# Patient Record
(demographics unavailable — no encounter records)

---

## 2024-10-29 NOTE — HISTORY OF PRESENT ILLNESS
[70: Cares for self; unalbe to carry on normal activity or do active work.] : 70: Cares for self; unable to carry on normal activity or do active work. 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 Patient began to make urine during her hospital stay, was discharged on 9/10/2019 and then required urgent HD on 9/12/2019 for fluid overload. Patient discharged again on 9/16/2019\par  \par  Pt had 2 recent admissions to [facility].  First with dyspnea and cardiac arrest - may have been related to high potassium.  Second admission for GI discomfort and had an endoscopy and colonoscopy - had a polyp removed.  Renal function improved eventually to a creatinine of 1.4.  \par  \par  Had PPM placed March 2020.\par  \par  PT seen at [facility] for concern over COVID but was negative in April 2020.  Then admitted in May, again negative for COVID but had respiratory failure requiring intubation.\par  \par  Pt was admitted in February with Covid19 2022.  Treated with remdesivir and decadron, discharged on oxygen but now off.  \par  \par   [de-identified] : Pt travelled to [facility] in august, developed right arm swelling, while off of Eliquis.  Came back, saw Dr. Urbina and started Eliquis, right arm swelling improved.  She was also recently told that her calcium was high by her PMD.   Pt has had bone density but was normal by patient report.

## 2024-10-29 NOTE — PLAN
[FreeTextEntry1] : 1. 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 Creatinine alfredito 1.3 but had DELFIN several times. Last creatinine 1.8 on diuretics. Had cfDNA several times which have been normal. 2. Immunosuppressed - target tacro 5-7. She is on tacro 2/2 and pred 5. Does not tolerate full dose MMF. Currently on MMF 250mgs BID. 3. HTN - BP controlled.  Ok to cont hydralazine 100mgs BID.  4. DM - stable. A1c elevated. Now following with her PMD. 5. RUE DVT - fistula arm. Has partially occlusive DVT, Eliquis was changed to 2.5mgs BID in hospital which she is still on. Right arm still slightly swollen likely due to central stenosis. Pt at risk for ROSALEE if she had fistula ballooned and may need fistula ligated. For now will try conservative treatment including arm band/ compression.  Edema worsened when off of Eliquis so now back on it.   7. Heart block - Had PPM placed in hospital in March 2020. Follows with Dr. Urbina. 8. Respiratory failure - multiple episodes since transplantation but no recent episodes. Unclear if related to cardiac disease or pulmonary. Has seen cardiology and had pacemaker placed for bradycardia. Renal duplex on 2/29 was not suggestive of TRAS. No recent episodes. Saw Dr. Urbina - repeat echo with LVH and some mitral disease.  Continue lasix.   9. Hyperparathyroidism - mild hypercalcemia.  She has tried sensipar in the past but she thought it caused edema.  will resume 30mgs daily.     f/u in 3 months. Pts PMD is Douglas Junior ([id number])

## 2024-10-29 NOTE — HISTORY OF PRESENT ILLNESS
[70: Cares for self; unalbe to carry on normal activity or do active work.] : 70: Cares for self; unable to carry on normal activity or do active work. 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 Patient began to make urine during her hospital stay, was discharged on 9/10/2019 and then required urgent HD on 9/12/2019 for fluid overload. Patient discharged again on 9/16/2019\par  \par  Pt had 2 recent admissions to [facility].  First with dyspnea and cardiac arrest - may have been related to high potassium.  Second admission for GI discomfort and had an endoscopy and colonoscopy - had a polyp removed.  Renal function improved eventually to a creatinine of 1.4.  \par  \par  Had PPM placed March 2020.\par  \par  PT seen at [facility] for concern over COVID but was negative in April 2020.  Then admitted in May, again negative for COVID but had respiratory failure requiring intubation.\par  \par  Pt was admitted in February with Covid19 2022.  Treated with remdesivir and decadron, discharged on oxygen but now off.  \par  \par   [de-identified] : Pt travelled to [place] in august, developed right arm swelling, while off of Eliquis.  Came back, saw Dr. Urbina and started Eliquis, right arm swelling improved.  She was also recently told that her calcium was high by her PMD.   Pt has had bone density but was normal by patient report.

## 2024-10-29 NOTE — REVIEW OF SYSTEMS
[As Noted in HPI] : as noted in HPI [Negative] : Heme/Lymph [Feeling Poorly] : not feeling poorly [Feeling Tired] : not feeling tired [Discharge From Eyes] : no purulent discharge from the eyes [Eyes Itch] : no itching of the eyes [Chest Pain] : no chest pain [Palpitations] : no palpitations [Wheezing] : no wheezing [SOB on Exertion] : no shortness of breath during exertion [Vomiting] : no vomiting [Diarrhea] : no diarrhea [Joint Swelling] : no joint swelling [Joint Stiffness] : no joint stiffness

## 2024-10-29 NOTE — BEGINNING OF VISIT
Problem: Musculor/Skeletal Functional Status  Intervention: PT Evaluation/treatment  Note: Increase safety and independence with functional mobility. [Patient] : patient

## 2024-10-29 NOTE — PLAN
[FreeTextEntry1] : 1. 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 Creatinine alfredito 1.3 but had DELFIN several times. Last creatinine 1.8 on diuretics. Had cfDNA several times which have been normal. 2. Immunosuppressed - target tacro 5-7. She is on tacro 2/2 and pred 5. Does not tolerate full dose MMF. Currently on MMF 250mgs BID. 3. HTN - BP controlled.  Ok to cont hydralazine 100mgs BID.  4. DM - stable. A1c elevated. Now following with her PMD. 5. RUE DVT - fistula arm. Has partially occlusive DVT, Eliquis was changed to 2.5mgs BID in hospital which she is still on. Right arm still slightly swollen likely due to central stenosis. Pt at risk for ROSALEE if she had fistula ballooned and may need fistula ligated. For now will try conservative treatment including arm band/ compression.  Edema worsened when off of Eliquis so now back on it.   7. Heart block - Had PPM placed in hospital in March 2020. Follows with Dr. Urbina. 8. Respiratory failure - multiple episodes since transplantation but no recent episodes. Unclear if related to cardiac disease or pulmonary. Has seen cardiology and had pacemaker placed for bradycardia. Renal duplex on 2/29 was not suggestive of TRAS. No recent episodes. Saw Dr. Urbina - repeat echo with LVH and some mitral disease.  Continue lasix.   9. Hyperparathyroidism - mild hypercalcemia.  She has tried sensipar in the past but she thought it caused edema.  will resume 30mgs daily.     f/u in 3 months. Pts PMD is Douglas Junior ([id number])

## 2024-10-29 NOTE — PHYSICAL EXAM
15 [General Appearance - Alert] : alert [General Appearance - In No Acute Distress] : in no acute distress [Outer Ear] : the ears and nose were normal in appearance [Neck Appearance] : the appearance of the neck was normal [Jugular Venous Distention Increased] : there was no jugular-venous distention [Respiration, Rhythm And Depth] : normal respiratory rhythm and effort [Exaggerated Use Of Accessory Muscles For Inspiration] : no accessory muscle use [Auscultation Breath Sounds / Voice Sounds] : lungs were clear to auscultation bilaterally [Heart Sounds Gallop] : no gallops [Heart Sounds Pericardial Friction Rub] : no pericardial rub [Abdomen Soft] : soft [Abdomen Tenderness] : non-tender [Cervical Lymph Nodes Enlarged Posterior Bilaterally] : posterior cervical [Cervical Lymph Nodes Enlarged Anterior Bilaterally] : anterior cervical [Involuntary Movements] : no involuntary movements were seen [___ (cm) Fistula] : [unfilled] (cm) fistula [Dialysis Catheter] : dialysis catheter [Skin Color & Pigmentation] : normal skin color and pigmentation [] : no rash [Cranial Nerves] : cranial nerves 2-12 were intact [No Focal Deficits] : no focal deficits [Oriented To Time, Place, And Person] : oriented to person, place, and time [Impaired Insight] : insight and judgment were intact [FreeTextEntry1] : mild RUE edema.  AVF present.

## 2024-10-29 NOTE — PHYSICAL EXAM
[General Appearance - Alert] : alert [General Appearance - In No Acute Distress] : in no acute distress [Outer Ear] : the ears and nose were normal in appearance [Neck Appearance] : the appearance of the neck was normal [Jugular Venous Distention Increased] : there was no jugular-venous distention [Respiration, Rhythm And Depth] : normal respiratory rhythm and effort [Exaggerated Use Of Accessory Muscles For Inspiration] : no accessory muscle use [Auscultation Breath Sounds / Voice Sounds] : lungs were clear to auscultation bilaterally [Heart Sounds Gallop] : no gallops [Heart Sounds Pericardial Friction Rub] : no pericardial rub [Abdomen Soft] : soft [Abdomen Tenderness] : non-tender [Cervical Lymph Nodes Enlarged Posterior Bilaterally] : posterior cervical [Cervical Lymph Nodes Enlarged Anterior Bilaterally] : anterior cervical [Involuntary Movements] : no involuntary movements were seen [___ (cm) Fistula] : [unfilled] (cm) fistula [Dialysis Catheter] : dialysis catheter [Skin Color & Pigmentation] : normal skin color and pigmentation [] : no rash [Cranial Nerves] : cranial nerves 2-12 were intact [No Focal Deficits] : no focal deficits [Oriented To Time, Place, And Person] : oriented to person, place, and time [Impaired Insight] : insight and judgment were intact [FreeTextEntry1] : mild RUE edema.  AVF present.

## 2025-01-21 NOTE — PLAN
[FreeTextEntry1] : 1. 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 Creatinine alfredito 1.3 but had DELFIN several times. Last creatinine 2 on diuretics. Had cfDNA several times which have been normal. 2. Immunosuppressed - target tacro 5-7. She is on tacro 2/2 and pred 5. Does not tolerate full dose MMF. Currently on MMF 250mgs BID. 3. HTN - BP controlled.   4. DM - stable. A1c elevated. Now following with her PMD. 5. RUE DVT - fistula arm. Has partially occlusive DVT, Eliquis was changed to 2.5mgs BID in hospital which she is still on. Right arm still slightly swollen likely due to central stenosis. Pt at risk for ROSALEE if she had fistula ballooned and may need fistula ligated. For now will try conservative treatment including arm band/ compression.  Edema worsened when off of Eliquis so now back on it.   7. Heart block - Had PPM placed in hospital in March 2020. Follows with Dr. Urbina. 8. Respiratory failure - multiple episodes since transplantation but no recent episodes. Unclear if related to cardiac disease or pulmonary. Has seen cardiology and had pacemaker placed for bradycardia. Renal duplex on 2/29 was not suggestive of TRAS. No recent episodes. Saw Dr. Urbina - repeat echo with LVH and some mitral disease.  Continue lasix.   9. Hyperparathyroidism - mild hypercalcemia.  She has tried sensipar in the past but she thought it caused edema.  She stopped it again as she does not feel well.  She feels heavy.    f/u in 3 months. Pts PMD is Douglas Junior ([id number])

## 2025-01-21 NOTE — HISTORY OF PRESENT ILLNESS
[70: Cares for self; unalbe to carry on normal activity or do active work.] : 70: Cares for self; unable to carry on normal activity or do active work. 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 Patient began to make urine during her hospital stay, was discharged on 9/10/2019 and then required urgent HD on 9/12/2019 for fluid overload. Patient discharged again on 9/16/2019\par  \par  Pt had 2 recent admissions to [facility].  First with dyspnea and cardiac arrest - may have been related to high potassium.  Second admission for GI discomfort and had an endoscopy and colonoscopy - had a polyp removed.  Renal function improved eventually to a creatinine of 1.4.  \par  \par  Had PPM placed March 2020.\par  \par  PT seen at [facility] for concern over COVID but was negative in April 2020.  Then admitted in May, again negative for COVID but had respiratory failure requiring intubation.\par  \par  Pt was admitted in February with Covid19 2022.  Treated with remdesivir and decadron, discharged on oxygen but now off.  \par  \par   [de-identified] : Pt travelled to [facility] in august, developed right arm swelling, while off of Eliquis.  Came back, saw Dr. Urbina and started Eliquis, right arm swelling improved.  She was also recently told that her calcium was high by her PMD.   Pt has had bone density but was normal by patient report.   She was on sensipar but stopped because it made her feel heavy. No recent hospitalizations.

## 2025-01-27 NOTE — CARDIOLOGY SUMMARY
[de-identified] : 01/23/25 - sinus rhythm with ventricular pacing [de-identified] : 02/26/14 (regadenoson MIBI) - breast attenuation artifact, LVEF 60% with no LV regional abnormality [de-identified] : 08/15/23 - MAC, mild MR, mean MV gradient 6 mmHg, calcified AV with normal opening, mild LAE, severe concentric LVH, moderate diastolic dysfunction, normal RV size and function, LVEF 75% 09/27/21 - MAC, mild MR, mild LAE, mild concentric LVH, mild diastolic dysfunction, normal RV size and function, LVEF 66% 10/04/19 - AV gradient (peak 19 mmHg, mean 9 mmHg), mild LAE, hyperdynamic LV, moderate concentric LVH, mild diastolic dysfunction, normal RV size and function, LVEF 80-85% [de-identified] : 03/04/20 - [employer]onic Micra AV PPM for 2nd degree AV block [de-identified] : 05/23/14 (CATH) - luminal irregularities, pLAD 20%

## 2025-01-27 NOTE — PHYSICAL EXAM
[Well Developed] : well developed [Well Nourished] : well nourished [No Acute Distress] : no acute distress [Normal Conjunctiva] : normal conjunctiva [Normal Venous Pressure] : normal venous pressure [No Carotid Bruit] : no carotid bruit [Normal S1, S2] : normal S1, S2 [No Rub] : no rub [No Gallop] : no gallop [Murmur] : murmur [Clear Lung Fields] : clear lung fields [Good Air Entry] : good air entry [No Respiratory Distress] : no respiratory distress  [Soft] : abdomen soft [Non Tender] : non-tender [Normal Gait] : normal gait [No Edema] : no edema [No Cyanosis] : no cyanosis [No Rash] : no rash [No Skin Lesions] : no skin lesions [Moves all extremities] : moves all extremities [No Focal Deficits] : no focal deficits [Normal Speech] : normal speech [Alert and Oriented] : alert and oriented [de-identified] : 3/6 HSM [de-identified] : RUE fistula

## 2025-01-27 NOTE — DISCUSSION/SUMMARY
[Paroxysmal Atrial Fibrillation] : paroxysmal atrial fibrillation [Hypertension] : hypertension [Mild Mitral Regurgitation] : mild mitral regurgitation [Compensated] : compensated [Stable] : stable [FreeTextEntry1] : Currently stable from a cardiovascular standpoint. Hypertensive today. Appears euvolemic. History of paroxysmal atrial fibrillation. Currently in sinus rhythm with ventricular pacing. Patient with leadless pacemaker (Micra) secondary to second degree AV block. Patient with renal transplant on immunosuppressive therapy (Cr 2.13, eGFR 24). History of mild MR/moderate MS on echo. No ischemic or CHF symptoms at this time. Has chronic right arm swelling (prior RUE fistula). Continue current medications including metoprolol tartrate 50 mg twice daily and Eliquis 2.5 mg twice daily. ECG completed today and reviewed (findings as noted above). Will schedule an echo to reassess her cardiac structures and function. Follow up in 4 months. [EKG obtained to assist in diagnosis and management of assessed problem(s)] : EKG obtained to assist in diagnosis and management of assessed problem(s)

## 2025-01-27 NOTE — CARDIOLOGY SUMMARY
[de-identified] : 01/23/25 - sinus rhythm with ventricular pacing [de-identified] : 02/26/14 (regadenoson MIBI) - breast attenuation artifact, LVEF 60% with no LV regional abnormality [de-identified] : 08/15/23 - MAC, mild MR, mean MV gradient 6 mmHg, calcified AV with normal opening, mild LAE, severe concentric LVH, moderate diastolic dysfunction, normal RV size and function, LVEF 75% 09/27/21 - MAC, mild MR, mild LAE, mild concentric LVH, mild diastolic dysfunction, normal RV size and function, LVEF 66% 10/04/19 - AV gradient (peak 19 mmHg, mean 9 mmHg), mild LAE, hyperdynamic LV, moderate concentric LVH, mild diastolic dysfunction, normal RV size and function, LVEF 80-85% [de-identified] : 03/04/20 - [employer]onic Micra AV PPM for 2nd degree AV block [de-identified] : 05/23/14 (CATH) - luminal irregularities, pLAD 20%

## 2025-01-27 NOTE — PHYSICAL EXAM
[Well Developed] : well developed [No Acute Distress] : no acute distress [Well Nourished] : well nourished [Normal Conjunctiva] : normal conjunctiva [Normal Venous Pressure] : normal venous pressure [No Carotid Bruit] : no carotid bruit [Normal S1, S2] : normal S1, S2 [No Rub] : no rub [No Gallop] : no gallop [Murmur] : murmur [Clear Lung Fields] : clear lung fields [Good Air Entry] : good air entry [No Respiratory Distress] : no respiratory distress  [Soft] : abdomen soft [Non Tender] : non-tender [Normal Gait] : normal gait [No Edema] : no edema [No Cyanosis] : no cyanosis [No Rash] : no rash [No Skin Lesions] : no skin lesions [Moves all extremities] : moves all extremities [No Focal Deficits] : no focal deficits [Normal Speech] : normal speech [Alert and Oriented] : alert and oriented [de-identified] : 3/6 HSM [de-identified] : RUE fistula

## 2025-02-03 NOTE — PHYSICAL EXAM
[General Appearance - Alert] : alert [General Appearance - In No Acute Distress] : in no acute distress [Outer Ear] : the ears and nose were normal in appearance [Neck Appearance] : the appearance of the neck was normal [Jugular Venous Distention Increased] : there was no jugular-venous distention [Respiration, Rhythm And Depth] : normal respiratory rhythm and effort [Exaggerated Use Of Accessory Muscles For Inspiration] : no accessory muscle use [Auscultation Breath Sounds / Voice Sounds] : lungs were clear to auscultation bilaterally [Heart Sounds Gallop] : no gallops [Heart Sounds Pericardial Friction Rub] : no pericardial rub [Abdomen Soft] : soft [Abdomen Tenderness] : non-tender [Cervical Lymph Nodes Enlarged Posterior Bilaterally] : posterior cervical [Cervical Lymph Nodes Enlarged Anterior Bilaterally] : anterior cervical [Involuntary Movements] : no involuntary movements were seen [___ (cm) Fistula] : [unfilled] (cm) fistula [Dialysis Catheter] : dialysis catheter [Skin Color & Pigmentation] : normal skin color and pigmentation [] : no rash [Cranial Nerves] : cranial nerves 2-12 were intact [No Focal Deficits] : no focal deficits [Oriented To Time, Place, And Person] : oriented to person, place, and time [Impaired Insight] : insight and judgment were intact

## 2025-04-17 NOTE — PHYSICAL EXAM
[Respiratory Effort] : normal respiratory effort [Normal Heart Sounds] : normal heart sounds [2+] : left 2+ [Alert] : alert [Calm] : calm [1+] : right 1+ [Abdomen Tenderness] : ~T ~M No abdominal tenderness [de-identified] : WN/WD, NAD [de-identified] : NC/AT [de-identified] : supple [FreeTextEntry1] : RUE with moderate edema, aneurysmal AVF noted over upper arm, bruit appreciated, no skin breakdown, no neurologic deficits [de-identified] : FROM [de-identified] : grossly intact

## 2025-04-17 NOTE — HISTORY OF PRESENT ILLNESS
[FreeTextEntry1] : 72yoF with PMHx of T2DM, HTN, AFib, ESRD on HD (started 2013),  s/p basilic vein transposition years ago, multiple fistulograms including axillary stents, now s/p DDRT 8/2019 presents for an evaluation. She has a history of Right IJ DVT dx 3/2020. Patient has been experiencing RUE edema that interferes with her daily activities. She states that her arm is heavy and she would like to know if anything can be done. She is overall feels well, no longer on dialysis. She is on Eliquis 2.5 mg BID.   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 Bidirectional flow of the right internal jugular vein above the region of clot. Likely partial clot of the right innominate vein as well.  The known right subclavian/brachiocephalic venous stent is not directly imaged on this study. Recommend chest CT with IV contrast to evaluate for degree of central venous involvement.

## 2025-04-17 NOTE — ADDENDUM
[FreeTextEntry1] : This note was written by Christen BASS, acting as a scribe for Dr. Froylan Horton.  I, Dr. Froylan Horton, have read and attest that all the information, medical decision-making, and discharge instructions within are true and accurate.  I, Dr. Froylan Horton, personally performed the evaluation and management (E/M) services for this new patient.  That E/M includes conducting the initial examination, assessing all conditions, and establishing the plan of care.  Today, my ACP, Christen BASS, was here to observe my evaluation and management services for this patient to be followed going forward.  I have spent 60 minutes of time on the encounter which excludes teaching and separately reported services.

## 2025-04-17 NOTE — ASSESSMENT
[Arterial/Venous Disease] : arterial/venous disease [FreeTextEntry1] : 72yoF with PMHx of T2DM, HTN, AFib, ESRD on HD (started 2013),  s/p basilic vein transposition years ago, multiple fistulograms including axillary stents, now s/p DDRT 8/2019 presents for an evaluation. Patient with RUE chronic edema that is bothersome.  On exam, RUE with moderate edema, aneurysmal AVF noted over upper arm, bruit appreciated, no skin breakdown, no neurologic deficits. Venous doppler was reviewed: Chronic DVT of the right internal jugular vein. Bidirectional flow of the right internal jugular vein above the region of clot. Likely partial clot of the right innominate vein as well.  The known right subclavian/brachiocephalic venous stent is not directly imaged on this study.  Discussed the findings and recommended to proceed with RUE venogram vs CTV. We will discuss it with patient's nephrologist since she is a kidney donor recipient and her most recent Cr is 1.76. Once we speak with Dr. Chauhan we will d/w further management with the patien.

## 2025-04-17 NOTE — PHYSICAL EXAM
[Respiratory Effort] : normal respiratory effort [Normal Heart Sounds] : normal heart sounds [2+] : left 2+ [Alert] : alert [Calm] : calm [1+] : right 1+ [Abdomen Tenderness] : ~T ~M No abdominal tenderness [de-identified] : WN/WD, NAD [de-identified] : NC/AT [de-identified] : supple [FreeTextEntry1] : RUE with moderate edema, aneurysmal AVF noted over upper arm, bruit appreciated, no skin breakdown, no neurologic deficits [de-identified] : FROM [de-identified] : grossly intact

## 2025-04-17 NOTE — PHYSICAL EXAM
[Respiratory Effort] : normal respiratory effort [Normal Heart Sounds] : normal heart sounds [2+] : left 2+ [Alert] : alert [Calm] : calm [1+] : right 1+ [Abdomen Tenderness] : ~T ~M No abdominal tenderness [de-identified] : WN/WD, NAD [de-identified] : NC/AT [de-identified] : supple [FreeTextEntry1] : RUE with moderate edema, aneurysmal AVF noted over upper arm, bruit appreciated, no skin breakdown, no neurologic deficits [de-identified] : FROM [de-identified] : grossly intact

## 2025-04-24 NOTE — HISTORY OF PRESENT ILLNESS
[70: Cares for self; unalbe to carry on normal activity or do active work.] : 70: Cares for self; unable to carry on normal activity or do active work. 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 Patient began to make urine during her hospital stay, was discharged on 9/10/2019 and then required urgent HD on 9/12/2019 for fluid overload. Patient discharged again on 9/16/2019  Pt had 2 recent admissions to [facility].  First with dyspnea and cardiac arrest - may have been related to high potassium.  Second admission for GI discomfort and had an endoscopy and colonoscopy - had a polyp removed.  Renal function improved eventually to a creatinine of 1.4.    Had PPM placed March 2020.  PT seen at [facility] for concern over COVID but was negative in April 2020.  Then admitted in May, again negative for COVID but had respiratory failure requiring intubation.  Pt was admitted in February with Covid19 2022.  Treated with remdesivir and decadron, discharged on oxygen but now off.    Pt travelled to [facility] in august 2024, developed right arm swelling, while off of Eliquis.  Came back, saw Dr. Urbina and started Eliquis, right arm swelling improved.  She was also recently told that her calcium was high by her PMD.   Pt has had bone density but was normal by patient report.   [de-identified] : No recent hospitalizations.  Right arm is very bothersome.  She saw Dr. Horton from vascular.  Also balance feels off and she is gaining weight.  Feels uncomfortable.

## 2025-04-24 NOTE — PLAN
[FreeTextEntry1] : 1. 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 Creatinine alfredito 1.3 but had DELFIN several times. Today creatinine is 1.6.  Had cfDNA several times which have been normal. 2. Immunosuppressed - target tacro 5-7, cont pred 5. Does not tolerate full dose MMF. Currently on MMF 250mgs BID. 3. HTN - BP controlled.   4. DM2 - stable. A1c elevated. Now following with her PMD.  Will try to start GLP1 for blood sugar and weight.   5. RUE DVT - fistula arm. Has partially occlusive DVT, Eliquis was changed to 2.5mgs BID in hospital which she is still on. Right arm still slightly swollen likely due to central stenosis. Pt at risk for ROSALEE if she had fistula ballooned and may need fistula ligated. Pt recently seen by Dr. Horton - will discuss plan.   7. Heart block - Had PPM placed in hospital in March 2020. Follows with Dr. Urbina. 8. Respiratory failure - multiple episodes since transplantation but no recent episodes. Unclear if related to cardiac disease or pulmonary. Has seen cardiology and had pacemaker placed for bradycardia. Renal duplex on 2/29 was not suggestive of TRAS. No recent episodes. Saw Dr. Urbina - repeat echo with LVH and some mitral disease.  Resue lasix.   9. Hyperparathyroidism - mild hypercalcemia.  She has tried sensipar in the past but she thought it caused edema.    f/u in 3 months. Pts PMD is Dr. Zhou Goldman.   Will have pharmacy discuss GLP1.

## 2025-05-29 NOTE — CARDIOLOGY SUMMARY
[de-identified] : 05/22/25 - sinus rhythm with ventricular pacing [de-identified] : 02/26/14 (regadenoson MIBI) - breast attenuation artifact, LVEF 60% with no LV regional abnormality [de-identified] : 04/16/25 - MAC, MV gradient (peak 12 mmHg, mean 6 mmHg), mild MR, mildLAE, moderate LVH, mild diastolic dysfunction, hyperdynamic LV, normal RV size and function, LVEF >75% 08/15/23 - MAC, mild MR, mean MV gradient 6 mmHg, calcified AV with normal opening, mild LAE, severe concentric LVH, moderate diastolic dysfunction, normal RV size and function, LVEF 75% 09/27/21 - MAC, mild MR, mild LAE, mild concentric LVH, mild diastolic dysfunction, normal RV size and function, LVEF 66% 10/04/19 - AV gradient (peak 19 mmHg, mean 9 mmHg), mild LAE, hyperdynamic LV, moderate concentric LVH, mild diastolic dysfunction, normal RV size and function, LVEF 80-85% [de-identified] : 03/04/20 - [employer] Micra AV PPM for 2nd degree AV block [de-identified] : 05/23/14 (CATH) - luminal irregularities, pLAD 20%

## 2025-05-29 NOTE — DISCUSSION/SUMMARY
[Paroxysmal Atrial Fibrillation] : paroxysmal atrial fibrillation [Hypertension] : hypertension [Mild Mitral Regurgitation] : mild mitral regurgitation [Compensated] : compensated [Stable] : stable [FreeTextEntry1] : Currently stable from a cardiovascular standpoint. Normotensive. Appears euvolemic. History of paroxysmal atrial fibrillation. Currently in sinus rhythm with ventricular pacing. Patient with leadless pacemaker (Micra) secondary to second degree AV block. Patient with renal transplant on immunosuppressive therapy (Cr 1.66, eGFR 33). History of mild MR/moderate MS on echo. No ischemic or CHF symptoms at this time. Has chronic right arm swelling (prior RUE fistula) although it has improved recently. Continue current medications including metoprolol tartrate 50 mg twice daily and Eliquis 2.5 mg twice daily. ECG completed today and reviewed (findings as noted above). Recent labs and echo from April reviewed. Follow up in 4-6 months. [EKG obtained to assist in diagnosis and management of assessed problem(s)] : EKG obtained to assist in diagnosis and management of assessed problem(s)

## 2025-05-29 NOTE — PHYSICAL EXAM
[Well Developed] : well developed [Well Nourished] : well nourished [No Acute Distress] : no acute distress [Normal Conjunctiva] : normal conjunctiva [Normal Venous Pressure] : normal venous pressure [No Carotid Bruit] : no carotid bruit [Normal S1, S2] : normal S1, S2 [No Rub] : no rub [No Gallop] : no gallop [Murmur] : murmur [Clear Lung Fields] : clear lung fields [Good Air Entry] : good air entry [No Respiratory Distress] : no respiratory distress  [Soft] : abdomen soft [Non Tender] : non-tender [Normal Gait] : normal gait [No Edema] : no edema [No Cyanosis] : no cyanosis [No Rash] : no rash [No Skin Lesions] : no skin lesions [Moves all extremities] : moves all extremities [No Focal Deficits] : no focal deficits [Normal Speech] : normal speech [Alert and Oriented] : alert and oriented [de-identified] : 3/6 HSM [de-identified] : RUE fistula

## 2025-05-29 NOTE — CARDIOLOGY SUMMARY
[de-identified] : 05/22/25 - sinus rhythm with ventricular pacing [de-identified] : 02/26/14 (regadenoson MIBI) - breast attenuation artifact, LVEF 60% with no LV regional abnormality [de-identified] : 04/16/25 - MAC, MV gradient (peak 12 mmHg, mean 6 mmHg), mild MR, mildLAE, moderate LVH, mild diastolic dysfunction, hyperdynamic LV, normal RV size and function, LVEF >75% 08/15/23 - MAC, mild MR, mean MV gradient 6 mmHg, calcified AV with normal opening, mild LAE, severe concentric LVH, moderate diastolic dysfunction, normal RV size and function, LVEF 75% 09/27/21 - MAC, mild MR, mild LAE, mild concentric LVH, mild diastolic dysfunction, normal RV size and function, LVEF 66% 10/04/19 - AV gradient (peak 19 mmHg, mean 9 mmHg), mild LAE, hyperdynamic LV, moderate concentric LVH, mild diastolic dysfunction, normal RV size and function, LVEF 80-85% [de-identified] : 03/04/20 - [employer] Micra AV PPM for 2nd degree AV block [de-identified] : 05/23/14 (CATH) - luminal irregularities, pLAD 20%

## 2025-05-29 NOTE — PHYSICAL EXAM
[Well Developed] : well developed [Well Nourished] : well nourished [No Acute Distress] : no acute distress [Normal Conjunctiva] : normal conjunctiva [Normal Venous Pressure] : normal venous pressure [No Carotid Bruit] : no carotid bruit [Normal S1, S2] : normal S1, S2 [No Rub] : no rub [No Gallop] : no gallop [Murmur] : murmur [Clear Lung Fields] : clear lung fields [Good Air Entry] : good air entry [No Respiratory Distress] : no respiratory distress  [Soft] : abdomen soft [Non Tender] : non-tender [Normal Gait] : normal gait [No Edema] : no edema [No Cyanosis] : no cyanosis [No Rash] : no rash [No Skin Lesions] : no skin lesions [Moves all extremities] : moves all extremities [No Focal Deficits] : no focal deficits [Normal Speech] : normal speech [Alert and Oriented] : alert and oriented [de-identified] : 3/6 HSM [de-identified] : RUE fistula

## 2025-07-10 NOTE — PLAN
[FreeTextEntry1] : 1. 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 Creatinine alfredito 1.3 but had DELFIN several times.Baseline creatinine is 1.6.  Had cfDNA several times which have been normal. 2. Immunosuppressed - target tacro 5-7, cont pred 5. Does not tolerate full dose MMF. Currently on MMF 250mgs BID. 3. HTN - BP controlled.   4. DM2 - stable. A1c elevated. Now following with her PMD.  She is now on Mounjaro.  Discussed increasing dose but she prefers to keep the same.   5. RUE DVT - fistula arm. Has partially occlusive DVT, Eliquis was changed to 2.5mgs BID in hospital which she is still on. Right arm still slightly swollen likely due to central stenosis. Pt at risk for ROSALEE if she had fistula ballooned and may need fistula ligated.   7. Heart block - Had PPM placed in hospital in March 2020. Follows with Dr. Urbina. 8. Respiratory failure - multiple episodes since transplantation but no recent episodes. Unclear if related to cardiac disease or pulmonary. Has seen cardiology and had pacemaker placed for bradycardia. Renal duplex on 2/29 was not suggestive of TRAS. No recent episodes. Saw Dr. Urbina - repeat echo with LVH and some mitral disease.   9. Hyperparathyroidism - mild hypercalcemia.  She has tried sensipar in the past but she thought it caused edema.    f/u in 3 months. Pts PMD is Dr. Zhou Goldman.   add on A1c.

## 2025-07-10 NOTE — HISTORY OF PRESENT ILLNESS
[70: Cares for self; unalbe to carry on normal activity or do active work.] : 70: Cares for self; unable to carry on normal activity or do active work. 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 Patient began to make urine during her hospital stay, was discharged on 9/10/2019 and then required urgent HD on 9/12/2019 for fluid overload. Patient discharged again on 9/16/2019  Pt had 2 recent admissions to [facility].  First with dyspnea and cardiac arrest - may have been related to high potassium.  Second admission for GI discomfort and had an endoscopy and colonoscopy - had a polyp removed.  Renal function improved eventually to a creatinine of 1.4.    Had PPM placed March 2020.  PT seen at [facility] for concern over COVID but was negative in April 2020.  Then admitted in May, again negative for COVID but had respiratory failure requiring intubation.  Pt was admitted in February with Covid19 2022.  Treated with remdesivir and decadron, discharged on oxygen but now off.    Pt travelled to [facility] in august 2024, developed right arm swelling, while off of Eliquis.  Came back, saw Dr. Urbina and started Eliquis, right arm swelling improved.  She was also recently told that her calcium was high by her PMD.   Pt has had bone density but was normal by patient report.   [de-identified] : No recent hospitalizations.  Right arm is still edematous and bothersome.  She saw Dr. Horton from vascular.  She is now on Mounjaro, weight has decreased and sugars have improved.